# Patient Record
Sex: MALE | Race: WHITE | NOT HISPANIC OR LATINO
[De-identification: names, ages, dates, MRNs, and addresses within clinical notes are randomized per-mention and may not be internally consistent; named-entity substitution may affect disease eponyms.]

---
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
## 2024-03-04 ENCOUNTER — OFFICE VISIT (OUTPATIENT)
Dept: URGENT CARE | Facility: CLINIC | Age: 19
End: 2024-03-04
Payer: COMMERCIAL

## 2024-03-04 VITALS
HEART RATE: 96 BPM | TEMPERATURE: 101 F | SYSTOLIC BLOOD PRESSURE: 137 MMHG | WEIGHT: 150.13 LBS | RESPIRATION RATE: 19 BRPM | OXYGEN SATURATION: 99 % | HEIGHT: 68 IN | DIASTOLIC BLOOD PRESSURE: 76 MMHG | BODY MASS INDEX: 22.75 KG/M2

## 2024-03-04 DIAGNOSIS — R11.0 NAUSEA: ICD-10-CM

## 2024-03-04 DIAGNOSIS — R19.7 DIARRHEA, UNSPECIFIED TYPE: ICD-10-CM

## 2024-03-04 DIAGNOSIS — R50.9 FEVER, UNSPECIFIED FEVER CAUSE: ICD-10-CM

## 2024-03-04 DIAGNOSIS — B34.9 ACUTE VIRAL SYNDROME: Primary | ICD-10-CM

## 2024-03-04 LAB
CTP QC/QA: YES
CTP QC/QA: YES
POC MOLECULAR INFLUENZA A AGN: NEGATIVE
POC MOLECULAR INFLUENZA B AGN: NEGATIVE
SARS-COV-2 AG RESP QL IA.RAPID: NEGATIVE

## 2024-03-04 PROCEDURE — 87811 SARS-COV-2 COVID19 W/OPTIC: CPT | Mod: QW,S$GLB,, | Performed by: NURSE PRACTITIONER

## 2024-03-04 PROCEDURE — 99204 OFFICE O/P NEW MOD 45 MIN: CPT | Mod: S$GLB,,, | Performed by: NURSE PRACTITIONER

## 2024-03-04 PROCEDURE — S0119 ONDANSETRON 4 MG: HCPCS | Mod: S$GLB,,, | Performed by: NURSE PRACTITIONER

## 2024-03-04 PROCEDURE — 87502 INFLUENZA DNA AMP PROBE: CPT | Mod: QW,S$GLB,, | Performed by: NURSE PRACTITIONER

## 2024-03-04 RX ORDER — ONDANSETRON 4 MG/1
TABLET, ORALLY DISINTEGRATING ORAL
Qty: 10 TABLET | Refills: 0 | Status: SHIPPED | OUTPATIENT
Start: 2024-03-04

## 2024-03-04 RX ORDER — ACETAMINOPHEN 500 MG
1000 TABLET ORAL
Status: COMPLETED | OUTPATIENT
Start: 2024-03-04 | End: 2024-03-04

## 2024-03-04 RX ORDER — ONDANSETRON 4 MG/1
4 TABLET, ORALLY DISINTEGRATING ORAL
Status: COMPLETED | OUTPATIENT
Start: 2024-03-04 | End: 2024-03-04

## 2024-03-04 RX ADMIN — ONDANSETRON 4 MG: 4 TABLET, ORALLY DISINTEGRATING ORAL at 03:03

## 2024-03-04 RX ADMIN — Medication 1000 MG: at 02:03

## 2024-03-04 NOTE — LETTER
March 4, 2024      Urgent Care - [facility]  [address] [address]  Phone: [phone]  Fax: [phone]       Patient: Roe Houston   YOB: 2005  Date of Visit: 03/04/2024    To Whom It May Concern:    Leana Houston  was at [facility] on 03/04/2024. The patient may return to work/school on 3/7/24 with no restrictions or sooner if fever free for 24 hours with improved symptoms. If you have any questions or concerns, or if I can be of further assistance, please do not hesitate to contact me.    Sincerely,            Ivette Castellanos, NP     
Offered, feeding was successful

## 2024-03-04 NOTE — PROGRESS NOTES
"Subjective:      Patient ID: Roe Houston is a 19 y.o. male.    Vitals:  height is 5' 8" (1.727 m) and weight is 68.1 kg (150 lb 2.1 oz). His oral temperature is 101.3 °F (38.5 °C) (abnormal). His blood pressure is 137/76 and his pulse is 96. His respiration is 19 and oxygen saturation is 99%.     Chief Complaint: Diarrhea    Pt presents vomiting, sweats, chills and body aches that started Saturday. Pt states yesterday he started having watery diarrhea. Pt has been taking Advil and Pepto, its not helping.    Diarrhea   This is a new problem. The current episode started in the past 7 days. The problem has been unchanged. The stool consistency is described as Watery. The patient states that diarrhea awakens him from sleep. Associated symptoms include chills, a fever, myalgias, sweats and vomiting. Pertinent negatives include no abdominal pain, arthralgias, bloating, coughing, headaches, increased  flatus, URI or weight loss. Nothing aggravates the symptoms. There are no known risk factors. He has tried bismuth subsalicylate for the symptoms. The treatment provided mild relief.       Constitution: Positive for chills, sweating, fatigue and fever.   HENT:  Positive for congestion. Negative for ear pain, postnasal drip, sinus pain, sore throat and trouble swallowing.    Neck: Negative for neck pain.   Cardiovascular:  Negative for chest pain.   Respiratory:  Negative for cough.    Gastrointestinal:  Positive for nausea, vomiting and diarrhea. Negative for abdominal pain, constipation and rectal bleeding.        Denies blood or mucous to his stool   Musculoskeletal:  Positive for muscle ache. Negative for joint pain.   Neurological:  Negative for headaches.      Objective:     Physical Exam   Constitutional: He is oriented to person, place, and time. He appears well-developed. He is cooperative.  Non-toxic appearance. He does not appear ill. No distress.   HENT:   Head: Normocephalic and atraumatic.   Ears:   Right Ear: " Hearing, tympanic membrane, external ear and ear canal normal.   Left Ear: Hearing, tympanic membrane, external ear and ear canal normal.   Nose: Nose normal. No mucosal edema, rhinorrhea or nasal deformity. No epistaxis. Right sinus exhibits no maxillary sinus tenderness and no frontal sinus tenderness. Left sinus exhibits no maxillary sinus tenderness and no frontal sinus tenderness.   Mouth/Throat: Uvula is midline, oropharynx is clear and moist and mucous membranes are normal. No trismus in the jaw. Normal dentition. No uvula swelling. No oropharyngeal exudate, posterior oropharyngeal edema or posterior oropharyngeal erythema.   Eyes: Conjunctivae and lids are normal. No scleral icterus.   Neck: Trachea normal and phonation normal. Neck supple. No edema present. No erythema present. No neck rigidity present.   Cardiovascular: Normal rate, regular rhythm, normal heart sounds and normal pulses.   Pulmonary/Chest: Effort normal and breath sounds normal. No respiratory distress. He has no decreased breath sounds. He has no rhonchi.   Abdominal: Normal appearance and bowel sounds are normal. He exhibits no distension and no mass. Soft. There is no abdominal tenderness.   Musculoskeletal: Normal range of motion.         General: No deformity. Normal range of motion.   Neurological: He is alert and oriented to person, place, and time. He has normal strength. He exhibits normal muscle tone. Coordination normal.   Skin: Skin is warm, dry, intact, not diaphoretic and not pale.   Psychiatric: His speech is normal and behavior is normal. Judgment and thought content normal.   Nursing note and vitals reviewed.      Assessment:     1. Acute viral syndrome    2. Diarrhea, unspecified type    3. Fever, unspecified fever cause    4. Nausea        Plan:     Results for orders placed or performed in visit on 03/04/24   POCT Influenza A/B MOLECULAR   Result Value Ref Range    POC Molecular Influenza A Ag Negative Negative, Not  Reported    POC Molecular Influenza B Ag Negative Negative, Not Reported     Acceptable Yes    SARS Coronavirus 2 Antigen, POCT Manual Read   Result Value Ref Range    SARS Coronavirus 2 Antigen Negative Negative     Acceptable Yes          Acute viral syndrome  -     ondansetron (ZOFRAN-ODT) 4 MG TbDL; One tablet sublingual tid prn nausea  Dispense: 10 tablet; Refill: 0    Diarrhea, unspecified type  -     POCT Influenza A/B MOLECULAR  -     SARS Coronavirus 2 Antigen, POCT Manual Read    Fever, unspecified fever cause  -     acetaminophen tablet 1,000 mg  -     SARS Coronavirus 2 Antigen, POCT Manual Read    Nausea  -     ondansetron disintegrating tablet 4 mg  -     ondansetron (ZOFRAN-ODT) 4 MG TbDL; One tablet sublingual tid prn nausea  Dispense: 10 tablet; Refill: 0        Patient Instructions     If your condition worsens or fails to improve we recommend that you receive another evaluation at the ER immediately or contact your PCP to discuss your concerns or return here. You must understand that you've received an urgent care treatment only and that you may be released before all your medical problems are known or treated. You the patient will arrange for followup care as instructed.   Zofran as needed for nausea or vomiting.  Maintain hydration by drinking small amounts of clear fluids frequently, then soft diet, and then advance diet as tolerated. May use OTC Imodium OR Pepto Bismol if desired for any diarrhea.    Watch for any increase pain, fever, localized pain to right lower abdomen or continued vomiting or diarrhea.

## 2024-03-06 ENCOUNTER — OFFICE VISIT (OUTPATIENT)
Dept: URGENT CARE | Facility: CLINIC | Age: 19
End: 2024-03-06
Payer: COMMERCIAL

## 2024-03-06 VITALS
BODY MASS INDEX: 22.73 KG/M2 | TEMPERATURE: 101 F | WEIGHT: 150 LBS | HEART RATE: 89 BPM | SYSTOLIC BLOOD PRESSURE: 129 MMHG | DIASTOLIC BLOOD PRESSURE: 86 MMHG | RESPIRATION RATE: 17 BRPM | OXYGEN SATURATION: 95 % | HEIGHT: 68 IN

## 2024-03-06 DIAGNOSIS — J06.9 BACTERIAL URI: Primary | ICD-10-CM

## 2024-03-06 DIAGNOSIS — B96.89 BACTERIAL URI: Primary | ICD-10-CM

## 2024-03-06 DIAGNOSIS — R50.9 FEVER, UNSPECIFIED FEVER CAUSE: ICD-10-CM

## 2024-03-06 LAB
CTP QC/QA: YES
POC MOLECULAR INFLUENZA A AGN: NEGATIVE
POC MOLECULAR INFLUENZA B AGN: NEGATIVE

## 2024-03-06 PROCEDURE — 87502 INFLUENZA DNA AMP PROBE: CPT | Mod: QW,S$GLB,, | Performed by: FAMILY MEDICINE

## 2024-03-06 PROCEDURE — 99213 OFFICE O/P EST LOW 20 MIN: CPT | Mod: S$GLB,,, | Performed by: FAMILY MEDICINE

## 2024-03-06 RX ORDER — AMOXICILLIN AND CLAVULANATE POTASSIUM 875; 125 MG/1; MG/1
1 TABLET, FILM COATED ORAL 2 TIMES DAILY
Qty: 20 TABLET | Refills: 0 | Status: SHIPPED | OUTPATIENT
Start: 2024-03-06 | End: 2024-03-16

## 2024-03-06 NOTE — LETTER
[facility]  Urgent Care  [address] [address]  Phone: [phone]  Fax: [phone] March 6, 2024    Patient: Roe Houston   Patient ID [id number]   YOB: 2005   Date of Visit: 3/6/2024       To Whom It May Concern:    Roe Houston was seen and treated in our emergency department on 3/6/2024 for a severe respiratory illness.  He may return to school on 03/11/24 .    Sincerely,       Yolanda Way, DO

## 2024-03-06 NOTE — PROGRESS NOTES
"Subjective:      Patient ID: Roe Houston is a 19 y.o. male.    Vitals:  height is 5' 8" (1.727 m) and weight is 68 kg (150 lb). His oral temperature is 100.9 °F (38.3 °C) (abnormal). His blood pressure is 129/86 and his pulse is 89. His respiration is 17 and oxygen saturation is 95%.     Chief Complaint: Fever    PT IS HERE WITH C/O CHILLS, SORE THROAT, FEVER, AND CONGESTION.    Sore Throat   This is a new problem. Episode onset: 3 DAYS. The problem has been unchanged. The maximum temperature recorded prior to his arrival was 101 - 101.9 F. Associated symptoms include abdominal pain, congestion, coughing, headaches, a hoarse voice, shortness of breath and swollen glands. Treatments tried: ADVIL, TYLENOL. The treatment provided mild relief.       HENT:  Positive for congestion and sore throat.    Respiratory:  Positive for cough and shortness of breath.    Gastrointestinal:  Positive for abdominal pain.   Neurological:  Positive for headaches.      Objective:     Physical Exam   Constitutional: He is oriented to person, place, and time. He appears well-developed. He is cooperative.  Non-toxic appearance. He appears ill.   HENT:   Head: Normocephalic and atraumatic.      Comments: Bilateral maxillary sinus tenderness  Ears:   Right Ear: Hearing, tympanic membrane, external ear and ear canal normal.   Left Ear: Hearing, tympanic membrane, external ear and ear canal normal.   Nose: Nose normal. No mucosal edema, rhinorrhea or nasal deformity. No epistaxis. Right sinus exhibits no maxillary sinus tenderness and no frontal sinus tenderness. Left sinus exhibits no maxillary sinus tenderness and no frontal sinus tenderness.   Mouth/Throat: Uvula is midline and mucous membranes are normal. Mucous membranes are moist. No trismus in the jaw. Normal dentition. No uvula swelling. Posterior oropharyngeal erythema present. No oropharyngeal exudate or posterior oropharyngeal edema.   Eyes: Conjunctivae and lids are normal. No " scleral icterus.   Neck: Trachea normal and phonation normal. Neck supple. No edema present. No erythema present. No neck rigidity present.   Cardiovascular: Normal rate, regular rhythm, normal heart sounds and normal pulses.   Pulmonary/Chest: Effort normal and breath sounds normal. No respiratory distress. He has no decreased breath sounds. He has no rhonchi (slight course BS).   Abdominal: Normal appearance.   Musculoskeletal: Normal range of motion.         General: No deformity or edema. Normal range of motion.   Neurological: He is alert and oriented to person, place, and time. He exhibits normal muscle tone. Coordination normal.   Skin: Skin is warm, intact, diaphoretic and not pale.         Comments: diaphoretic   Psychiatric: His speech is normal and behavior is normal. Judgment and thought content normal.   Nursing note and vitals reviewed.      Assessment:     1. Bacterial URI    2. Fever, unspecified fever cause        Plan:       Bacterial URI  -     amoxicillin-clavulanate 875-125mg (AUGMENTIN) 875-125 mg per tablet; Take 1 tablet by mouth 2 (two) times daily. for 10 days  Dispense: 20 tablet; Refill: 0    Fever, unspecified fever cause  -     POCT Influenza A/B MOLECULAR  -     amoxicillin-clavulanate 875-125mg (AUGMENTIN) 875-125 mg per tablet; Take 1 tablet by mouth 2 (two) times daily. for 10 days  Dispense: 20 tablet; Refill: 0    Pt or guardian provided educational materials and instructions regarding their visit diagnosis.

## 2024-03-08 ENCOUNTER — OFFICE VISIT (OUTPATIENT)
Dept: URGENT CARE | Facility: CLINIC | Age: 19
End: 2024-03-08
Payer: COMMERCIAL

## 2024-03-08 VITALS
HEART RATE: 51 BPM | WEIGHT: 150 LBS | BODY MASS INDEX: 22.73 KG/M2 | DIASTOLIC BLOOD PRESSURE: 76 MMHG | SYSTOLIC BLOOD PRESSURE: 119 MMHG | OXYGEN SATURATION: 99 % | TEMPERATURE: 97 F | HEIGHT: 68 IN | RESPIRATION RATE: 19 BRPM

## 2024-03-08 DIAGNOSIS — J18.9 PNEUMONIA OF LEFT LUNG DUE TO INFECTIOUS ORGANISM, UNSPECIFIED PART OF LUNG: Primary | ICD-10-CM

## 2024-03-08 PROCEDURE — 71046 X-RAY EXAM CHEST 2 VIEWS: CPT | Mod: S$GLB,,, | Performed by: RADIOLOGY

## 2024-03-08 PROCEDURE — 99213 OFFICE O/P EST LOW 20 MIN: CPT | Mod: S$GLB,,, | Performed by: FAMILY MEDICINE

## 2024-03-08 NOTE — PROGRESS NOTES
"Subjective:      Patient ID: Roe Houston is a 19 y.o. male.    Vitals:  height is 5' 8" (1.727 m) and weight is 68 kg (150 lb). His oral temperature is 97.4 °F (36.3 °C). His blood pressure is 119/76 and his pulse is 51 (abnormal). His respiration is 19 and oxygen saturation is 99%.     Chief Complaint: Pain    Patient presents with pain in deep breathing, sweats, chest tightness,  constantly nose blowing with green mucus, pain in back. Onset 2 days. Pt was started on Augmentin 2 days ago for fever and URI symptoms at the time he wasn't coughing but reports his cough started the evening he was seen(2 days ago). He rports he is feeling better but was concerned about the cough and chest pains    Pain  This is a new problem. Episode onset: -2days. The treatment provided no relief.     ROS   Objective:     Physical Exam   Constitutional: He is oriented to person, place, and time. He appears well-developed. He is cooperative.  Non-toxic appearance. He does not appear ill. No distress.   HENT:   Head: Normocephalic and atraumatic.   Ears:   Right Ear: Hearing, tympanic membrane, external ear and ear canal normal.   Left Ear: Hearing, tympanic membrane, external ear and ear canal normal.   Nose: Nose normal. No mucosal edema, rhinorrhea or nasal deformity. No epistaxis. Right sinus exhibits no maxillary sinus tenderness and no frontal sinus tenderness. Left sinus exhibits no maxillary sinus tenderness and no frontal sinus tenderness.   Mouth/Throat: Uvula is midline, oropharynx is clear and moist and mucous membranes are normal. Mucous membranes are moist. No trismus in the jaw. Normal dentition. No uvula swelling. No oropharyngeal exudate, posterior oropharyngeal edema or posterior oropharyngeal erythema. Oropharynx is clear.   Eyes: Conjunctivae and lids are normal. No scleral icterus.   Neck: Trachea normal and phonation normal. Neck supple. No edema present. No erythema present. No neck rigidity present. "   Cardiovascular: Normal rate, regular rhythm, normal heart sounds and normal pulses.   Pulmonary/Chest: Effort normal and breath sounds normal. No respiratory distress. He has no decreased breath sounds. He has no rhonchi (loose rhonchi anteriorly on the left).   Abdominal: Normal appearance.   Musculoskeletal: Normal range of motion.         General: No deformity or edema. Normal range of motion.   Neurological: He is alert and oriented to person, place, and time. He exhibits normal muscle tone. Coordination normal.   Skin: Skin is warm, dry, intact, not diaphoretic and not pale.   Psychiatric: His speech is normal and behavior is normal. Judgment and thought content normal.   Nursing note and vitals reviewed.  Clinically pt appears much improved overall than when I saw him 2 days ago. No more fever or diaphoresis    Assessment:     1. Pneumonia of left lung due to infectious organism, unspecified part of lung        Plan:   The preliminary reading of your xray showed no acute findings. We will call you if the final read is different than what we discussed.     Pneumonia of left lung due to infectious organism, unspecified part of lung  -     X-Ray Chest PA And Lateral; Future; Expected date: 03/08/2024    Pt or guardian provided educational materials and instructions regarding their visit diagnosis.

## 2024-03-23 ENCOUNTER — HOSPITAL ENCOUNTER (EMERGENCY)
Facility: OTHER | Age: 19
Discharge: HOME | End: 2024-03-23
Payer: COMMERCIAL

## 2024-03-23 VITALS
OXYGEN SATURATION: 98 % | HEART RATE: 98 BPM | SYSTOLIC BLOOD PRESSURE: 126 MMHG | RESPIRATION RATE: 18 BRPM | TEMPERATURE: 99 F | DIASTOLIC BLOOD PRESSURE: 79 MMHG

## 2024-03-23 DIAGNOSIS — S05.01XA ABRASION OF RIGHT CORNEA, INITIAL ENCOUNTER: Primary | ICD-10-CM

## 2024-03-23 PROCEDURE — 25000003 PHARM REV CODE 250

## 2024-03-23 PROCEDURE — 99283 EMERGENCY DEPT VISIT LOW MDM: CPT

## 2024-03-23 RX ORDER — GENTAMICIN SULFATE 3 MG/ML
2 SOLUTION/ DROPS OPHTHALMIC
Status: COMPLETED | OUTPATIENT
Start: 2024-03-23 | End: 2024-03-23

## 2024-03-23 RX ORDER — GENTAMICIN SULFATE 3 MG/ML
2 SOLUTION/ DROPS OPHTHALMIC
Qty: 15 ML | Refills: 0 | Status: SHIPPED | OUTPATIENT
Start: 2024-03-23 | End: 2024-03-28

## 2024-03-23 RX ORDER — PROPARACAINE HYDROCHLORIDE 5 MG/ML
2 SOLUTION/ DROPS OPHTHALMIC
Status: COMPLETED | OUTPATIENT
Start: 2024-03-23 | End: 2024-03-23

## 2024-03-23 RX ADMIN — GENTAMICIN SULFATE 2 DROP: 3 SOLUTION/ DROPS OPHTHALMIC at 10:03

## 2024-03-23 RX ADMIN — PROPARACAINE HYDROCHLORIDE 2 DROP: 5 SOLUTION/ DROPS OPHTHALMIC at 08:03

## 2024-03-23 RX ADMIN — FLUORESCEIN SODIUM 1 EACH: 1 STRIP OPHTHALMIC at 08:03

## 2024-03-24 NOTE — DISCHARGE INSTRUCTIONS
Please use gentamicin drop for corneal abrasion. 2 drops into the right eye 6 times daily for 5 days. Do not rub your eyes. Throw away your old contacts. Do not use contacts in that eye until your abrasion has completely healed. Follow up with ophthalmology. Referral has been provided. You can take ibuprofen as needed for eye irritation.

## 2024-03-24 NOTE — ED PROVIDER NOTES
"Encounter Date: 3/23/2024       History     Chief Complaint   Patient presents with    Eye Problem     C/O R eye discharge and irritation x 2 hours; thinks eye contact may be stuck. Pt unable to open eye in triage.     Roe Houston is a 19 y.o. healthy male presenting to the emergency department for evaluation of right eye "irritation", redness, and tearing that began around 6 pm tonight. States that he did put his contacts in around "2:30 or 3 pm" today. He states that his eye became irritated around 6 pm and he has been rubbing the eye stating that he deals with seasonal allergies. He is unsure if his contact is lodged behind his eye. States that he became concerned when he could not open his eye all the way. He has tried flushing the eye and rubbing the eye lid. He denies eye pain, vision changes or photophobia. No history of eye injury or surgery.       The history is provided by the patient.     Review of patient's allergies indicates:  No Known Allergies  No past medical history on file.  No past surgical history on file.  Family History   Problem Relation Age of Onset    No Known Problems Mother     No Known Problems Father      Social History     Tobacco Use    Smoking status: Former     Current packs/day: 0.00     Types: Cigarettes, Vaping with nicotine     Quit date: 2024     Years since quittin.0    Smokeless tobacco: Never   Substance Use Topics    Alcohol use: Yes     Review of Systems   Eyes:  Positive for discharge, redness and itching. Negative for photophobia, pain and visual disturbance.       Physical Exam     Initial Vitals [24]   BP Pulse Resp Temp SpO2   130/80 102 18 99.1 °F (37.3 °C) 96 %      MAP       --         Physical Exam    Vitals reviewed.  Constitutional: He appears well-developed and well-nourished. No distress.   HENT:   Head: Normocephalic and atraumatic.   Nose: Nose normal.   Mouth/Throat: Oropharynx is clear and moist.   Eyes: EOM are normal. Pupils are " equal, round, and reactive to light. Right eye exhibits no discharge. Left eye exhibits no discharge.   Right conjunctiva injected. Some tearing. No purulent drainage. No foreign body visualized. No hyphema. On wood's lamp exam, patient reports symptoms improved after application of proparacaine drops into the right eye. Fluorescein uptake on right cornea. Moderate sized corneal abrasion present.     20/200 in right eye without contact present. 20/20 in left eye.    Neck: Neck supple.   Normal range of motion.  Cardiovascular:  Normal rate, regular rhythm, normal heart sounds and intact distal pulses.           Pulmonary/Chest: Breath sounds normal. No respiratory distress. He has no wheezes. He has no rhonchi. He has no rales. He exhibits no tenderness.   Abdominal: Abdomen is soft. Bowel sounds are normal. He exhibits no distension and no mass. There is no abdominal tenderness. There is no rebound and no guarding.   Musculoskeletal:         General: No edema. Normal range of motion.      Cervical back: Normal range of motion and neck supple.     Neurological: He is alert and oriented to person, place, and time. He has normal strength.   Skin: Skin is warm and dry.   Psychiatric: He has a normal mood and affect. His behavior is normal. Judgment and thought content normal.         ED Course   Procedures  Labs Reviewed - No data to display         Imaging Results    None          Medications   fluorescein ophthalmic strip 1 each (1 each Both Eyes Given 3/23/24 2055)   proparacaine 0.5 % ophthalmic solution 2 drop (2 drops Right Eye Given 3/23/24 2055)   gentamicin 0.3 % ophthalmic solution 2 drop (2 drops Right Eye Given 3/23/24 2208)     Medical Decision Making  Risk  Prescription drug management.                          Medical Decision Making:   Initial Assessment:   Urgent evaluation of healthy 19-year-old male presenting for right eye irritation, redness, and tearing that began around 6:00 p.m. tonight.  He was  a contact lens wearer and had his contacts on prior to onset of eye irritation and redness.  Admits that he has been rubbing his eye and massaging the right eyelid.  On exam, he is well-appearing and nontoxic.  Hemodynamically stable.  Afebrile in the ED. right conjunctiva is injected.  On Wood's lamp exam, there is fluorescein uptake on the right cornea indicative of corneal abrasion.  He did have some relief of symptoms after application of proparacaine drops.  Patient received two drops of gentamicin ophthalmic solution in the ED. He was discharged home with prescription for gentamicin.  Advised him to not rub his eye for the next few days.  Patient advised to discard his old contacts and to refrain from wearing any contacts until his symptoms completely resolve.  Ambulatory referral to Ophthalmology was also provided.  Patient verbalized understanding and agreement with this plan of care. He was given specific return precautions. Advised to follow up with PCP as needed. All questions and concerns addressed. He is stable for discharge.     This note was created with MModal Fluency Direct Dictation. Please excuse any spelling or grammatical errors.             Clinical Impression:  Final diagnoses:  [S05.01XA] Abrasion of right cornea, initial encounter (Primary)          ED Disposition Condition    Discharge           ED Prescriptions       Medication Sig Dispense Start Date End Date Auth. Provider    gentamicin (GARAMYCIN) 0.3 % ophthalmic solution Place 2 drops into the right eye 6 (six) times daily. for 5 days 15 mL 3/23/2024 3/28/2024 Kehinde Epstein PA-C          Follow-up Information       Follow up With Specialties Details Why Contact Info Additional Information    [facility] - 10th Fl Ophthalmology Call in 2 days  [address]  [facility] [address] [phone] Please arrive on the 10th floor for check-in.             Kehinde Epstein PA-C  03/24/24 [id number]

## 2024-03-25 ENCOUNTER — TELEPHONE (OUTPATIENT)
Dept: OPHTHALMOLOGY | Facility: CLINIC | Age: 19
End: 2024-03-25
Payer: COMMERCIAL

## 2024-03-25 ENCOUNTER — OFFICE VISIT (OUTPATIENT)
Dept: OPTOMETRY | Facility: CLINIC | Age: 19
End: 2024-03-25
Payer: COMMERCIAL

## 2024-03-25 DIAGNOSIS — S05.01XD ABRASION OF RIGHT CORNEA, SUBSEQUENT ENCOUNTER: ICD-10-CM

## 2024-03-25 PROCEDURE — 1160F RVW MEDS BY RX/DR IN RCRD: CPT | Mod: CPTII,S$GLB,, | Performed by: OPTOMETRIST

## 2024-03-25 PROCEDURE — 99203 OFFICE O/P NEW LOW 30 MIN: CPT | Mod: S$GLB,,, | Performed by: OPTOMETRIST

## 2024-03-25 PROCEDURE — 99999 PR PBB SHADOW E&M-EST. PATIENT-LVL III: CPT | Mod: PBBFAC,,, | Performed by: OPTOMETRIST

## 2024-03-25 PROCEDURE — 1159F MED LIST DOCD IN RCRD: CPT | Mod: CPTII,S$GLB,, | Performed by: OPTOMETRIST

## 2024-03-25 NOTE — PROGRESS NOTES
HPI    Patient is here today for cornea abrasion OD follow up. Last seen on   03/23/2024 at [facility] ED. Patient states OD has improved. No pain, but   occasional burning and itching. States he has seasonal allergies and is   taking Zyrtec.     Eye Meds: Garamycin -- 2 drops in OD 6x a day  Last edited by Sharmaine Leroy on 3/25/2024  1:35 PM.            Assessment /Plan     For exam results, see Encounter Report.    Abrasion of right cornea, subsequent encounter  -     Ambulatory referral/consult to Ophthalmology      Follow up from [facility] for K abrasion OD, which has resolved on meds.  Pt asymptomatic today    PLAN:    OK to stop AB drops, and return to [facility] tomorrow (pt denied EW)  Pt will call if sx recur, otherwise rtc for routine

## 2024-03-25 NOTE — TELEPHONE ENCOUNTER
----- Message from Luli Alexandre sent at 3/25/2024  8:03 AM CDT -----  Regarding: ED F/U  Patient called to schedule ED F/U.     Call back- [phone]

## 2024-10-08 ENCOUNTER — TELEPHONE (OUTPATIENT)
Dept: URGENT CARE | Facility: CLINIC | Age: 19
End: 2024-10-08
Payer: COMMERCIAL

## 2024-10-08 NOTE — TELEPHONE ENCOUNTER
Patient received voicemail message and returned call to [facility].  Patient states he is doing better, but did have some abrasions to face and knee after falling a couple of days ago.  Encouraged pt to visit [facility] if follow up care is needed.  Patient states they may come to [facility] tomorrow for a visit to have wounds checked, but believes they are fine.  Pt thankful for phone call and denies any further needs at this time.

## 2024-10-08 NOTE — TELEPHONE ENCOUNTER
BIT report received from [place] requested outreach phone call to check on student.  Phoned pt to check health status and offer assistance with follow up care.  No answer, left message on voicemail with [facility] contact info.